# Patient Record
Sex: MALE | Race: WHITE | Employment: OTHER | URBAN - METROPOLITAN AREA
[De-identification: names, ages, dates, MRNs, and addresses within clinical notes are randomized per-mention and may not be internally consistent; named-entity substitution may affect disease eponyms.]

---

## 2019-08-09 ENCOUNTER — OFFICE VISIT (OUTPATIENT)
Dept: CARDIOLOGY CLINIC | Age: 79
End: 2019-08-09

## 2019-08-09 VITALS
HEIGHT: 70 IN | WEIGHT: 223 LBS | OXYGEN SATURATION: 97 % | HEART RATE: 68 BPM | BODY MASS INDEX: 31.92 KG/M2 | DIASTOLIC BLOOD PRESSURE: 78 MMHG | SYSTOLIC BLOOD PRESSURE: 130 MMHG | RESPIRATION RATE: 17 BRPM

## 2019-08-09 DIAGNOSIS — Z82.49 FAMILY HISTORY OF EARLY CAD: ICD-10-CM

## 2019-08-09 DIAGNOSIS — R00.2 HEART PALPITATIONS: Primary | ICD-10-CM

## 2019-08-09 DIAGNOSIS — I49.1 PAC (PREMATURE ATRIAL CONTRACTION): ICD-10-CM

## 2019-08-09 RX ORDER — OMEPRAZOLE 20 MG/1
20 TABLET, DELAYED RELEASE ORAL
COMMUNITY

## 2019-08-09 NOTE — PROGRESS NOTES
[place]: [place]  [phone]    History of Present Illness:  Mr. Jesus Martinez is a 67 yo M with family history of early coronary artery disease referred by Dr. Baltazar Jesus for cardiac evaluation. He is here due to irregular heartbeat. He was going for a checkup as it had been a while since his last colonoscopy and they noted there on exam to have an irregular heart rhythm. He denies any prior cardiac history. He does have very rare occasional palpitation. No exertional chest pain or shortness of breath. He is compensated on exam with clear lungs and no lower extremity edema. His EKG was normal sinus rhythm. There was an occasional premature atrial contraction. Soc hx. No tobacco. Self business, laundry equipment. Fam hx. Brother MI 48 yo. Assessment and Plan:    1. Palpitation, PAC. These are benign and he is asymptomatic with these. Reassured the patient. No additional cardiac evaluation is indicated at this time. We did talk about the symptoms that we would sometimes be concerned like lightheadedness, dizziness or shortness of breath where he might need a heart monitor. He can follow here on an as needed basis. 2. Family history of early coronary artery disease. He  has a past medical history of Left groin pain (5/12/2014). All other systems negative except as above. PE  Vitals:    08/09/19 0913   BP: 130/78   Pulse: 68   Resp: 17   SpO2: 97%   Weight: 223 lb (101.2 kg)   Height: 5' 10\" (1.778 m)    Body mass index is 32 kg/m².    General appearance - alert, well appearing, and in no distress  Mental status - affect appropriate to mood  Eyes - sclera anicteric, moist mucous membranes  Neck - supple, no JVD  Chest - clear to auscultation, no wheezes, rales or rhonchi  Heart - normal rate, regular rhythm, normal S1, S2, no murmurs, rubs, clicks or gallops  Abdomen - soft, nontender, nondistended, no masses or organomegaly  Neurological - no focal deficit  Extremities - peripheral pulses normal, no pedal edema      Recent Labs:  No results found for: CHOL, CHOLX, CHLST, CHOLV, 418683, HDL, LDL, LDLC, DLDLP, TGLX, TRIGL, TRIGP, CHHD, CHHDX  No results found for: JERROD, CREAPOC, [address], CREA, REFC3, REFC4  No results found for: BUN, BUNPOC, IBUN, MBUNV, BUNV  No results found for: K, KI, PLK, WBK  No results found for: HBA1C, HGBE8, GGR5OCGK, FZQ8RBTL  No results found for: HGBPOC, HGB, HGBP, HGBEXT, HGBEXT  No results found for: PLT, PLTEXT, PLTEXT    Reviewed:  Past Medical History:   Diagnosis Date    Left groin pain 5/12/2014     Social History     Tobacco Use   Smoking Status Never Smoker   Smokeless Tobacco Never Used     Social History     Substance and Sexual Activity   Alcohol Use No     Allergies   Allergen Reactions    Sulfa (Sulfonamide Antibiotics) Hives       Current Outpatient Medications   Medication Sig    omeprazole (PRILOSEC OTC) 20 mg tablet Take 20 mg by mouth daily. No current facility-administered medications for this visit.         Shanna Peabody, MD  [facility] and [facility] [place]  [address] [facility] [address]  [address]

## 2019-08-09 NOTE — LETTER
8/10/2019 1:30 AM 
 
Patient:  Charity Blank YOB: 1940 Date of Visit: 8/9/2019 Dear MD Kitty Ruiz [address] [place] [address] A [facility] 7 [id number] VIA Facsimile: [phone] 
 : 
 
 
Thank you for referring Mr. Charity Blank to me for evaluation/treatment. Below are the relevant portions of my assessment and plan of care. Mr. Dola Goodpasture is a 65 yo M with family history of early coronary artery disease referred by Dr. Denise Lilly for cardiac evaluation. He is here due to irregular heartbeat. He was going for a checkup as it had been a while since his last colonoscopy and they noted there on exam to have an irregular heart rhythm. He denies any prior cardiac history. He does have very rare occasional palpitation. No exertional chest pain or shortness of breath. He is compensated on exam with clear lungs and no lower extremity edema. His EKG was normal sinus rhythm. There was an occasional premature atrial contraction. Soc hx. No tobacco. Self business, laundry equipment. Fam hx. Brother MI 48 yo. Assessment and Plan: 1. Palpitation, PAC. These are benign and he is asymptomatic with these. Reassured the patient. No additional cardiac evaluation is indicated at this time. We did talk about the symptoms that we would sometimes be concerned like lightheadedness, dizziness or shortness of breath where he might need a heart monitor. He can follow here on an as needed basis. 2. Family history of early coronary artery disease. If you have questions, please do not hesitate to call me. Sincerely, Kieran Gudino MD

## 2019-08-09 NOTE — LETTER
8/10/2019 1:30 AM 
 
Patient:  An Figueroa YOB: 1940 Date of Visit: 8/9/2019 Dear Bossman Callejas MD 
[address] [place] [address] VIA Facsimile: [phone] MD Aldo [address] [place] [address] VIA Facsimile: [phone] 
 : 
 
 
Thank you for referring Mr. An Figueroa to me for evaluation/treatment. Below are the relevant portions of my assessment and plan of care. Mr. Sydnie Marquis is a 65 yo M with family history of early coronary artery disease referred by Dr. Mami Montes for cardiac evaluation. He is here due to irregular heartbeat. He was going for a checkup as it had been a while since his last colonoscopy and they noted there on exam to have an irregular heart rhythm. He denies any prior cardiac history. He does have very rare occasional palpitation. No exertional chest pain or shortness of breath. He is compensated on exam with clear lungs and no lower extremity edema. His EKG was normal sinus rhythm. There was an occasional premature atrial contraction. Soc hx. No tobacco. Self business, laundry equipment. Fam hx. Brother MI 48 yo. Assessment and Plan: 1. Palpitation, PAC. These are benign and he is asymptomatic with these. Reassured the patient. No additional cardiac evaluation is indicated at this time. We did talk about the symptoms that we would sometimes be concerned like lightheadedness, dizziness or shortness of breath where he might need a heart monitor. He can follow here on an as needed basis. 2. Family history of early coronary artery disease. If you have questions, please do not hesitate to call me. Sincerely, Jennifer Benavides MD

## 2020-07-10 ENCOUNTER — HOSPITAL ENCOUNTER (OUTPATIENT)
Dept: PREADMISSION TESTING | Age: 80
Discharge: HOME | End: 2020-07-10
Payer: MEDICARE

## 2020-07-10 VITALS
BODY MASS INDEX: 31.81 KG/M2 | TEMPERATURE: 98 F | HEIGHT: 70 IN | WEIGHT: 222.22 LBS | DIASTOLIC BLOOD PRESSURE: 74 MMHG | HEART RATE: 68 BPM | SYSTOLIC BLOOD PRESSURE: 150 MMHG | RESPIRATION RATE: 20 BRPM

## 2020-07-10 LAB
ANION GAP SERPL CALC-SCNC: 7 MMOL/L (ref 5–15)
BASOPHILS # BLD: 0 K/UL (ref 0–0.1)
BASOPHILS NFR BLD: 1 % (ref 0–1)
BUN SERPL-MCNC: 22 MG/DL (ref 6–20)
BUN/CREAT SERPL: 22 (ref 12–20)
CALCIUM SERPL-MCNC: 8.3 MG/DL (ref 8.5–10.1)
CHLORIDE SERPL-SCNC: 109 MMOL/L (ref 97–108)
CO2 SERPL-SCNC: 22 MMOL/L (ref 21–32)
CREAT SERPL-MCNC: 1.02 MG/DL (ref 0.7–1.3)
DIFFERENTIAL METHOD BLD: NORMAL
EOSINOPHIL # BLD: 0.3 K/UL (ref 0–0.4)
EOSINOPHIL NFR BLD: 5 % (ref 0–7)
ERYTHROCYTE [DISTWIDTH] IN BLOOD BY AUTOMATED COUNT: 13.2 % (ref 11.5–14.5)
GLUCOSE SERPL-MCNC: 118 MG/DL (ref 65–100)
HCT VFR BLD AUTO: 41.3 % (ref 36.6–50.3)
HGB BLD-MCNC: 13.5 G/DL (ref 12.1–17)
IMM GRANULOCYTES # BLD AUTO: 0 K/UL (ref 0–0.04)
IMM GRANULOCYTES NFR BLD AUTO: 0 % (ref 0–0.5)
LYMPHOCYTES # BLD: 1.1 K/UL (ref 0.8–3.5)
LYMPHOCYTES NFR BLD: 20 % (ref 12–49)
MCH RBC QN AUTO: 31.6 PG (ref 26–34)
MCHC RBC AUTO-ENTMCNC: 32.7 G/DL (ref 30–36.5)
MCV RBC AUTO: 96.7 FL (ref 80–99)
MONOCYTES # BLD: 0.5 K/UL (ref 0–1)
MONOCYTES NFR BLD: 10 % (ref 5–13)
NEUTS SEG # BLD: 3.5 K/UL (ref 1.8–8)
NEUTS SEG NFR BLD: 64 % (ref 32–75)
NRBC # BLD: 0 K/UL (ref 0–0.01)
NRBC BLD-RTO: 0 PER 100 WBC
PLATELET # BLD AUTO: 180 K/UL (ref 150–400)
PMV BLD AUTO: 10.6 FL (ref 8.9–12.9)
POTASSIUM SERPL-SCNC: 4.2 MMOL/L (ref 3.5–5.1)
RBC # BLD AUTO: 4.27 M/UL (ref 4.1–5.7)
SODIUM SERPL-SCNC: 138 MMOL/L (ref 136–145)
WBC # BLD AUTO: 5.4 K/UL (ref 4.1–11.1)

## 2020-07-10 PROCEDURE — 36415 COLL VENOUS BLD VENIPUNCTURE: CPT

## 2020-07-10 PROCEDURE — 80048 BASIC METABOLIC PNL TOTAL CA: CPT

## 2020-07-10 PROCEDURE — 93005 ELECTROCARDIOGRAM TRACING: CPT

## 2020-07-10 PROCEDURE — 85025 COMPLETE CBC W/AUTO DIFF WBC: CPT

## 2020-07-10 NOTE — PERIOP NOTES
PATIENT ADVISED NOT TO EAT/DRINK ANYTHING PAST MIDNIGHT EVENING PRIOR TO SURGERY,  NOTHING TO EAT/DRINK MORNING OF SURGERY UNLESS SIP OF WATER TO SWALLOW MEDICATION;  LEAVE ALL VALUABLES AT HOME; DO BRING PICTURE ID, INSURANCE CARD AND ANY COPAY; WEAR COMFORTABLE CLOTHING;  NO PERFUMES, POWDERS, LOTIONS; NO ALCOHOL 24 HOURS BEFORE OR AFTER SURGERY;  WILL NEED TO BE DRIVEN HOME BY FAMILY OR FRIEND;  AVOID TAKING NSAIDS, ASPIRIN, FISH OIL, VITAMIN E OR GLUCOSAMINE/CHONDROITIN DURING THIS TIME PRIOR TO SURGERY;  MAY TAKE TYLENOL. INSTRUCTIONS PROVIDED ON THE USE OF CHLORHEXIDINE SOLUTION THE EVENING BEFORE AND THE MORNING OF SURGERY. INSTRUCTED  TO FOLLOW NEW PROCEDURE FOR ARRIVAL FOR THE DAY OF SURGERY. WRITTEN INFORMATION PROVIDED. INFORMATION PROVIDED ON COVID 19 TESTING AND SELF QUARANTINE 4 DAYS PRIOR TO SURGERY. WRITTEN AND VERBAL INFORMATION GIVEN. PATIENT VOICED UNDERSTANDING OF SAME.

## 2020-07-11 LAB
ATRIAL RATE: 62 BPM
CALCULATED P AXIS, ECG09: 14 DEGREES
CALCULATED R AXIS, ECG10: -25 DEGREES
CALCULATED T AXIS, ECG11: 38 DEGREES
DIAGNOSIS, 93000: NORMAL
P-R INTERVAL, ECG05: 222 MS
Q-T INTERVAL, ECG07: 396 MS
QRS DURATION, ECG06: 100 MS
QTC CALCULATION (BEZET), ECG08: 401 MS
VENTRICULAR RATE, ECG03: 62 BPM

## 2020-07-16 ENCOUNTER — HOSPITAL ENCOUNTER (OUTPATIENT)
Dept: PREADMISSION TESTING | Age: 80
Discharge: HOME | End: 2020-07-16
Payer: MEDICARE

## 2020-07-16 DIAGNOSIS — U07.1 COVID-19: ICD-10-CM

## 2020-07-16 PROCEDURE — 87635 SARS-COV-2 COVID-19 AMP PRB: CPT

## 2020-07-17 ENCOUNTER — ANESTHESIA EVENT (OUTPATIENT)
Dept: SURGERY | Age: 80
End: 2020-07-17
Payer: MEDICARE

## 2020-07-18 LAB — SARS-COV-2, COV2NT: NOT DETECTED

## 2020-07-20 ENCOUNTER — ANESTHESIA (OUTPATIENT)
Dept: SURGERY | Age: 80
End: 2020-07-20
Payer: MEDICARE

## 2020-07-20 ENCOUNTER — HOSPITAL ENCOUNTER (OUTPATIENT)
Age: 80
Discharge: HOME | End: 2020-07-21
Attending: SURGERY | Admitting: SURGERY
Payer: MEDICARE

## 2020-07-20 DIAGNOSIS — C44.92 SCCA (SQUAMOUS CELL CARCINOMA) OF SKIN: Primary | ICD-10-CM

## 2020-07-20 PROCEDURE — 77030026438 HC STYL ET INTUB CARD -A: Performed by: ANESTHESIOLOGY

## 2020-07-20 PROCEDURE — 88305 TISSUE EXAM BY PATHOLOGIST: CPT

## 2020-07-20 PROCEDURE — 77030019908 HC STETH ESOPH SIMS -A: Performed by: ANESTHESIOLOGY

## 2020-07-20 PROCEDURE — 74011000250 HC RX REV CODE- 250: Performed by: SURGERY

## 2020-07-20 PROCEDURE — 74011250636 HC RX REV CODE- 250/636: Performed by: SURGERY

## 2020-07-20 PROCEDURE — 99218 HC RM OBSERVATION: CPT

## 2020-07-20 PROCEDURE — 74011000258 HC RX REV CODE- 258: Performed by: SURGERY

## 2020-07-20 PROCEDURE — 74011250636 HC RX REV CODE- 250/636: Performed by: ANESTHESIOLOGY

## 2020-07-20 PROCEDURE — 77030040361 HC SLV COMPR DVT MDII -B: Performed by: SURGERY

## 2020-07-20 PROCEDURE — 76060000038 HC ANESTHESIA 3.5 TO 4 HR: Performed by: SURGERY

## 2020-07-20 PROCEDURE — 77030040922 HC BLNKT HYPOTHRM STRY -A

## 2020-07-20 PROCEDURE — 77030013079 HC BLNKT BAIR HGGR 3M -A: Performed by: ANESTHESIOLOGY

## 2020-07-20 PROCEDURE — 88331 PATH CONSLTJ SURG 1 BLK 1SPC: CPT

## 2020-07-20 PROCEDURE — 77030011640 HC PAD GRND REM COVD -A: Performed by: SURGERY

## 2020-07-20 PROCEDURE — 94760 N-INVAS EAR/PLS OXIMETRY 1: CPT

## 2020-07-20 PROCEDURE — 74011000250 HC RX REV CODE- 250: Performed by: NURSE ANESTHETIST, CERTIFIED REGISTERED

## 2020-07-20 PROCEDURE — 77030002996 HC SUT SLK J&J -A: Performed by: SURGERY

## 2020-07-20 PROCEDURE — 77030002888 HC SUT CHRMC J&J -A: Performed by: SURGERY

## 2020-07-20 PROCEDURE — 77030008684 HC TU ET CUF COVD -B: Performed by: ANESTHESIOLOGY

## 2020-07-20 PROCEDURE — 74011000258 HC RX REV CODE- 258: Performed by: NURSE ANESTHETIST, CERTIFIED REGISTERED

## 2020-07-20 PROCEDURE — 74011250637 HC RX REV CODE- 250/637: Performed by: SURGERY

## 2020-07-20 PROCEDURE — 74011250636 HC RX REV CODE- 250/636: Performed by: NURSE ANESTHETIST, CERTIFIED REGISTERED

## 2020-07-20 PROCEDURE — 74011250637 HC RX REV CODE- 250/637: Performed by: ANESTHESIOLOGY

## 2020-07-20 PROCEDURE — 77030031139 HC SUT VCRL2 J&J -A: Performed by: SURGERY

## 2020-07-20 PROCEDURE — 77030002997 HC SUT SLK J&J -B: Performed by: SURGERY

## 2020-07-20 PROCEDURE — 76210000006 HC OR PH I REC 0.5 TO 1 HR: Performed by: SURGERY

## 2020-07-20 PROCEDURE — 76010000133 HC OR TIME 3 TO 3.5 HR: Performed by: SURGERY

## 2020-07-20 PROCEDURE — 77030002916 HC SUT ETHLN J&J -A: Performed by: SURGERY

## 2020-07-20 PROCEDURE — 77030018836 HC SOL IRR NACL ICUM -A: Performed by: SURGERY

## 2020-07-20 RX ORDER — ONDANSETRON 2 MG/ML
INJECTION INTRAMUSCULAR; INTRAVENOUS AS NEEDED
Status: DISCONTINUED | OUTPATIENT
Start: 2020-07-20 | End: 2020-07-20 | Stop reason: HOSPADM

## 2020-07-20 RX ORDER — MORPHINE SULFATE 2 MG/ML
2 INJECTION, SOLUTION INTRAMUSCULAR; INTRAVENOUS
Status: DISCONTINUED | OUTPATIENT
Start: 2020-07-20 | End: 2020-07-20 | Stop reason: HOSPADM

## 2020-07-20 RX ORDER — PANTOPRAZOLE SODIUM 40 MG/1
40 TABLET, DELAYED RELEASE ORAL
Status: DISCONTINUED | OUTPATIENT
Start: 2020-07-20 | End: 2020-07-21 | Stop reason: HOSPADM

## 2020-07-20 RX ORDER — OXYCODONE AND ACETAMINOPHEN 5; 325 MG/1; MG/1
1 TABLET ORAL AS NEEDED
Status: DISCONTINUED | OUTPATIENT
Start: 2020-07-20 | End: 2020-07-20 | Stop reason: HOSPADM

## 2020-07-20 RX ORDER — DEXAMETHASONE SODIUM PHOSPHATE 4 MG/ML
INJECTION, SOLUTION INTRA-ARTICULAR; INTRALESIONAL; INTRAMUSCULAR; INTRAVENOUS; SOFT TISSUE AS NEEDED
Status: DISCONTINUED | OUTPATIENT
Start: 2020-07-20 | End: 2020-07-20 | Stop reason: HOSPADM

## 2020-07-20 RX ORDER — FENTANYL CITRATE 50 UG/ML
50 INJECTION, SOLUTION INTRAMUSCULAR; INTRAVENOUS AS NEEDED
Status: DISCONTINUED | OUTPATIENT
Start: 2020-07-20 | End: 2020-07-20 | Stop reason: HOSPADM

## 2020-07-20 RX ORDER — PANTOPRAZOLE SODIUM 40 MG/1
40 TABLET, DELAYED RELEASE ORAL DAILY
Status: DISCONTINUED | OUTPATIENT
Start: 2020-07-21 | End: 2020-07-20

## 2020-07-20 RX ORDER — MIDAZOLAM HYDROCHLORIDE 1 MG/ML
INJECTION, SOLUTION INTRAMUSCULAR; INTRAVENOUS AS NEEDED
Status: DISCONTINUED | OUTPATIENT
Start: 2020-07-20 | End: 2020-07-20 | Stop reason: HOSPADM

## 2020-07-20 RX ORDER — SODIUM CHLORIDE 0.9 % (FLUSH) 0.9 %
5-40 SYRINGE (ML) INJECTION EVERY 8 HOURS
Status: DISCONTINUED | OUTPATIENT
Start: 2020-07-20 | End: 2020-07-21 | Stop reason: HOSPADM

## 2020-07-20 RX ORDER — SODIUM CHLORIDE 0.9 % (FLUSH) 0.9 %
5-40 SYRINGE (ML) INJECTION AS NEEDED
Status: DISCONTINUED | OUTPATIENT
Start: 2020-07-20 | End: 2020-07-21 | Stop reason: HOSPADM

## 2020-07-20 RX ORDER — MIDAZOLAM HYDROCHLORIDE 1 MG/ML
1 INJECTION, SOLUTION INTRAMUSCULAR; INTRAVENOUS AS NEEDED
Status: DISCONTINUED | OUTPATIENT
Start: 2020-07-20 | End: 2020-07-20 | Stop reason: HOSPADM

## 2020-07-20 RX ORDER — ONDANSETRON 2 MG/ML
4 INJECTION INTRAMUSCULAR; INTRAVENOUS
Status: DISCONTINUED | OUTPATIENT
Start: 2020-07-20 | End: 2020-07-21 | Stop reason: HOSPADM

## 2020-07-20 RX ORDER — SODIUM CHLORIDE 9 MG/ML
25 INJECTION, SOLUTION INTRAVENOUS CONTINUOUS
Status: DISCONTINUED | OUTPATIENT
Start: 2020-07-20 | End: 2020-07-20 | Stop reason: HOSPADM

## 2020-07-20 RX ORDER — FENTANYL CITRATE 50 UG/ML
INJECTION, SOLUTION INTRAMUSCULAR; INTRAVENOUS AS NEEDED
Status: DISCONTINUED | OUTPATIENT
Start: 2020-07-20 | End: 2020-07-20 | Stop reason: HOSPADM

## 2020-07-20 RX ORDER — LIDOCAINE HYDROCHLORIDE 20 MG/ML
INJECTION, SOLUTION EPIDURAL; INFILTRATION; INTRACAUDAL; PERINEURAL AS NEEDED
Status: DISCONTINUED | OUTPATIENT
Start: 2020-07-20 | End: 2020-07-20 | Stop reason: HOSPADM

## 2020-07-20 RX ORDER — ACETAMINOPHEN 325 MG/1
650 TABLET ORAL ONCE
Status: COMPLETED | OUTPATIENT
Start: 2020-07-20 | End: 2020-07-20

## 2020-07-20 RX ORDER — SODIUM CHLORIDE 0.9 % (FLUSH) 0.9 %
5-40 SYRINGE (ML) INJECTION AS NEEDED
Status: DISCONTINUED | OUTPATIENT
Start: 2020-07-20 | End: 2020-07-20 | Stop reason: HOSPADM

## 2020-07-20 RX ORDER — ROCURONIUM BROMIDE 10 MG/ML
INJECTION, SOLUTION INTRAVENOUS AS NEEDED
Status: DISCONTINUED | OUTPATIENT
Start: 2020-07-20 | End: 2020-07-20 | Stop reason: HOSPADM

## 2020-07-20 RX ORDER — HYDROMORPHONE HYDROCHLORIDE 1 MG/ML
0.2 INJECTION, SOLUTION INTRAMUSCULAR; INTRAVENOUS; SUBCUTANEOUS
Status: COMPLETED | OUTPATIENT
Start: 2020-07-20 | End: 2020-07-20

## 2020-07-20 RX ORDER — ONDANSETRON 2 MG/ML
INJECTION INTRAMUSCULAR; INTRAVENOUS
Status: DISCONTINUED
Start: 2020-07-20 | End: 2020-07-20

## 2020-07-20 RX ORDER — MORPHINE SULFATE 2 MG/ML
2-4 INJECTION, SOLUTION INTRAMUSCULAR; INTRAVENOUS
Status: DISCONTINUED | OUTPATIENT
Start: 2020-07-20 | End: 2020-07-21 | Stop reason: HOSPADM

## 2020-07-20 RX ORDER — PHENYLEPHRINE HCL IN 0.9% NACL 0.4MG/10ML
SYRINGE (ML) INTRAVENOUS AS NEEDED
Status: DISCONTINUED | OUTPATIENT
Start: 2020-07-20 | End: 2020-07-20 | Stop reason: HOSPADM

## 2020-07-20 RX ORDER — CEFAZOLIN SODIUM/WATER 2 G/20 ML
2 SYRINGE (ML) INTRAVENOUS
Status: COMPLETED | OUTPATIENT
Start: 2020-07-20 | End: 2020-07-20

## 2020-07-20 RX ORDER — ONDANSETRON 2 MG/ML
4 INJECTION INTRAMUSCULAR; INTRAVENOUS AS NEEDED
Status: DISCONTINUED | OUTPATIENT
Start: 2020-07-20 | End: 2020-07-20 | Stop reason: HOSPADM

## 2020-07-20 RX ORDER — SODIUM CHLORIDE 0.9 % (FLUSH) 0.9 %
5-40 SYRINGE (ML) INJECTION EVERY 8 HOURS
Status: DISCONTINUED | OUTPATIENT
Start: 2020-07-20 | End: 2020-07-20 | Stop reason: HOSPADM

## 2020-07-20 RX ORDER — OXYCODONE HCL 5 MG/5 ML
5-10 SOLUTION, ORAL ORAL
Status: DISCONTINUED | OUTPATIENT
Start: 2020-07-20 | End: 2020-07-21 | Stop reason: HOSPADM

## 2020-07-20 RX ORDER — PROPOFOL 10 MG/ML
INJECTION, EMULSION INTRAVENOUS AS NEEDED
Status: DISCONTINUED | OUTPATIENT
Start: 2020-07-20 | End: 2020-07-20 | Stop reason: HOSPADM

## 2020-07-20 RX ORDER — SODIUM CHLORIDE, SODIUM LACTATE, POTASSIUM CHLORIDE, CALCIUM CHLORIDE 600; 310; 30; 20 MG/100ML; MG/100ML; MG/100ML; MG/100ML
125 INJECTION, SOLUTION INTRAVENOUS CONTINUOUS
Status: DISCONTINUED | OUTPATIENT
Start: 2020-07-20 | End: 2020-07-20 | Stop reason: HOSPADM

## 2020-07-20 RX ORDER — ACETAMINOPHEN 325 MG/1
650 TABLET ORAL
Status: DISCONTINUED | OUTPATIENT
Start: 2020-07-20 | End: 2020-07-21 | Stop reason: HOSPADM

## 2020-07-20 RX ORDER — MIDAZOLAM HYDROCHLORIDE 1 MG/ML
0.5 INJECTION, SOLUTION INTRAMUSCULAR; INTRAVENOUS
Status: DISCONTINUED | OUTPATIENT
Start: 2020-07-20 | End: 2020-07-20 | Stop reason: HOSPADM

## 2020-07-20 RX ORDER — NALOXONE HYDROCHLORIDE 0.4 MG/ML
0.4 INJECTION, SOLUTION INTRAMUSCULAR; INTRAVENOUS; SUBCUTANEOUS AS NEEDED
Status: DISCONTINUED | OUTPATIENT
Start: 2020-07-20 | End: 2020-07-21 | Stop reason: HOSPADM

## 2020-07-20 RX ORDER — FAMOTIDINE 20 MG/1
20 TABLET, FILM COATED ORAL
Status: DISCONTINUED | OUTPATIENT
Start: 2020-07-20 | End: 2020-07-21 | Stop reason: HOSPADM

## 2020-07-20 RX ORDER — DEXTROSE MONOHYDRATE, SODIUM CHLORIDE, SODIUM LACTATE, POTASSIUM CHLORIDE, CALCIUM CHLORIDE 5; 600; 310; 179; 20 G/100ML; MG/100ML; MG/100ML; MG/100ML; MG/100ML
INJECTION, SOLUTION INTRAVENOUS CONTINUOUS
Status: DISPENSED | OUTPATIENT
Start: 2020-07-20 | End: 2020-07-21

## 2020-07-20 RX ORDER — LIDOCAINE HYDROCHLORIDE 10 MG/ML
0.1 INJECTION, SOLUTION EPIDURAL; INFILTRATION; INTRACAUDAL; PERINEURAL AS NEEDED
Status: DISCONTINUED | OUTPATIENT
Start: 2020-07-20 | End: 2020-07-20 | Stop reason: HOSPADM

## 2020-07-20 RX ORDER — MUPIROCIN 20 MG/G
OINTMENT TOPICAL DAILY
Status: DISCONTINUED | OUTPATIENT
Start: 2020-07-21 | End: 2020-07-21 | Stop reason: HOSPADM

## 2020-07-20 RX ORDER — DIPHENHYDRAMINE HYDROCHLORIDE 50 MG/ML
12.5 INJECTION, SOLUTION INTRAMUSCULAR; INTRAVENOUS AS NEEDED
Status: DISCONTINUED | OUTPATIENT
Start: 2020-07-20 | End: 2020-07-20 | Stop reason: HOSPADM

## 2020-07-20 RX ORDER — SUCCINYLCHOLINE CHLORIDE 20 MG/ML
INJECTION INTRAMUSCULAR; INTRAVENOUS AS NEEDED
Status: DISCONTINUED | OUTPATIENT
Start: 2020-07-20 | End: 2020-07-20 | Stop reason: HOSPADM

## 2020-07-20 RX ORDER — FENTANYL CITRATE 50 UG/ML
25 INJECTION, SOLUTION INTRAMUSCULAR; INTRAVENOUS
Status: COMPLETED | OUTPATIENT
Start: 2020-07-20 | End: 2020-07-20

## 2020-07-20 RX ADMIN — FENTANYL CITRATE 50 MCG: 50 INJECTION, SOLUTION INTRAMUSCULAR; INTRAVENOUS at 09:29

## 2020-07-20 RX ADMIN — HYDROMORPHONE HYDROCHLORIDE 0.2 MG: 1 INJECTION, SOLUTION INTRAMUSCULAR; INTRAVENOUS; SUBCUTANEOUS at 18:45

## 2020-07-20 RX ADMIN — Medication 100 MCG: at 10:08

## 2020-07-20 RX ADMIN — PHENYLEPHRINE HYDROCHLORIDE 40 MCG/MIN: 10 INJECTION INTRAVENOUS at 10:32

## 2020-07-20 RX ADMIN — HYDROMORPHONE HYDROCHLORIDE 0.2 MG: 1 INJECTION, SOLUTION INTRAMUSCULAR; INTRAVENOUS; SUBCUTANEOUS at 18:15

## 2020-07-20 RX ADMIN — MEPERIDINE HYDROCHLORIDE 12.5 MG: 25 INJECTION INTRAMUSCULAR; INTRAVENOUS; SUBCUTANEOUS at 16:30

## 2020-07-20 RX ADMIN — Medication 10 ML: at 21:34

## 2020-07-20 RX ADMIN — SODIUM CHLORIDE, SODIUM LACTATE, POTASSIUM CHLORIDE, AND CALCIUM CHLORIDE 125 ML/HR: 600; 310; 30; 20 INJECTION, SOLUTION INTRAVENOUS at 08:15

## 2020-07-20 RX ADMIN — Medication 2 G: at 09:45

## 2020-07-20 RX ADMIN — MORPHINE SULFATE 4 MG: 2 INJECTION, SOLUTION INTRAMUSCULAR; INTRAVENOUS at 21:34

## 2020-07-20 RX ADMIN — ONDANSETRON HYDROCHLORIDE 4 MG: 2 INJECTION, SOLUTION INTRAMUSCULAR; INTRAVENOUS at 12:43

## 2020-07-20 RX ADMIN — MIDAZOLAM 2 MG: 1 INJECTION INTRAMUSCULAR; INTRAVENOUS at 09:12

## 2020-07-20 RX ADMIN — FENTANYL CITRATE 25 MCG: 50 INJECTION, SOLUTION INTRAMUSCULAR; INTRAVENOUS at 16:15

## 2020-07-20 RX ADMIN — Medication 100 MCG: at 10:05

## 2020-07-20 RX ADMIN — Medication 100 MCG: at 10:24

## 2020-07-20 RX ADMIN — ONDANSETRON HYDROCHLORIDE 4 MG: 2 INJECTION, SOLUTION INTRAMUSCULAR; INTRAVENOUS at 10:12

## 2020-07-20 RX ADMIN — SODIUM CHLORIDE, SODIUM LACTATE, POTASSIUM CHLORIDE, AND CALCIUM CHLORIDE: 600; 310; 30; 20 INJECTION, SOLUTION INTRAVENOUS at 11:17

## 2020-07-20 RX ADMIN — MEPERIDINE HYDROCHLORIDE 12.5 MG: 25 INJECTION INTRAMUSCULAR; INTRAVENOUS; SUBCUTANEOUS at 16:00

## 2020-07-20 RX ADMIN — FENTANYL CITRATE 25 MCG: 50 INJECTION, SOLUTION INTRAMUSCULAR; INTRAVENOUS at 16:10

## 2020-07-20 RX ADMIN — HYDROMORPHONE HYDROCHLORIDE 0.2 MG: 1 INJECTION, SOLUTION INTRAMUSCULAR; INTRAVENOUS; SUBCUTANEOUS at 18:30

## 2020-07-20 RX ADMIN — PROPOFOL 30 MG: 10 INJECTION, EMULSION INTRAVENOUS at 09:37

## 2020-07-20 RX ADMIN — PANTOPRAZOLE SODIUM 40 MG: 40 TABLET, DELAYED RELEASE ORAL at 21:34

## 2020-07-20 RX ADMIN — HYDROMORPHONE HYDROCHLORIDE 0.2 MG: 1 INJECTION, SOLUTION INTRAMUSCULAR; INTRAVENOUS; SUBCUTANEOUS at 17:40

## 2020-07-20 RX ADMIN — FENTANYL CITRATE 25 MCG: 50 INJECTION, SOLUTION INTRAMUSCULAR; INTRAVENOUS at 16:05

## 2020-07-20 RX ADMIN — SUCCINYLCHOLINE CHLORIDE 140 MG: 20 INJECTION, SOLUTION INTRAMUSCULAR; INTRAVENOUS at 09:23

## 2020-07-20 RX ADMIN — CEFAZOLIN 1 G: 1 INJECTION, POWDER, FOR SOLUTION INTRAMUSCULAR; INTRAVENOUS at 16:51

## 2020-07-20 RX ADMIN — Medication 10 ML: at 14:00

## 2020-07-20 RX ADMIN — LIDOCAINE HYDROCHLORIDE 100 MG: 20 INJECTION, SOLUTION EPIDURAL; INFILTRATION; INTRACAUDAL; PERINEURAL at 09:22

## 2020-07-20 RX ADMIN — Medication 100 MCG: at 09:56

## 2020-07-20 RX ADMIN — DEXTROSE MONOHYDRATE, SODIUM CHLORIDE, SODIUM LACTATE, POTASSIUM CHLORIDE, CALCIUM CHLORIDE: 5; 600; 310; 179; 20 INJECTION, SOLUTION INTRAVENOUS at 15:30

## 2020-07-20 RX ADMIN — FENTANYL CITRATE 50 MCG: 50 INJECTION, SOLUTION INTRAMUSCULAR; INTRAVENOUS at 09:22

## 2020-07-20 RX ADMIN — PROPOFOL 100 MG: 10 INJECTION, EMULSION INTRAVENOUS at 09:22

## 2020-07-20 RX ADMIN — ROCURONIUM BROMIDE 5 MG: 10 SOLUTION INTRAVENOUS at 09:22

## 2020-07-20 RX ADMIN — Medication 10 ML: at 16:31

## 2020-07-20 RX ADMIN — ACETAMINOPHEN 650 MG: 325 TABLET ORAL at 08:21

## 2020-07-20 RX ADMIN — DEXAMETHASONE SODIUM PHOSPHATE 4 MG: 4 INJECTION, SOLUTION INTRAMUSCULAR; INTRAVENOUS at 10:12

## 2020-07-20 RX ADMIN — HYDROMORPHONE HYDROCHLORIDE 0.2 MG: 1 INJECTION, SOLUTION INTRAMUSCULAR; INTRAVENOUS; SUBCUTANEOUS at 17:55

## 2020-07-20 RX ADMIN — FENTANYL CITRATE 25 MCG: 50 INJECTION, SOLUTION INTRAMUSCULAR; INTRAVENOUS at 16:20

## 2020-07-20 RX ADMIN — PROPOFOL 100 MG: 10 INJECTION, EMULSION INTRAVENOUS at 09:23

## 2020-07-20 NOTE — H&P
Pre-op History and Physical    CC: SCCA LOWER LIP   HPI: 78y.o. 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   Past medical history:   Past Medical History:   Diagnosis Date    Cancer ([address]     SCCA LOWER LIP    GERD (gastroesophageal reflux disease)     Left groin pain 5/12/2014      Past surgical history:   Past Surgical History:   Procedure Laterality Date    HX HERNIA REPAIR Right       Family history:   Family History   Problem Relation Age of Onset    Heart Disease Mother         MI X4    Diabetes Father     Stroke Father     Cancer Brother         PROSTATE CA    Elevated Lipids Brother     Elevated Lipids Brother     Cancer Brother         LEUKEMIA    Cancer Sister         BREAST CA    Cancer Sister         BREAST CA    Cancer Sister         BREAST CA    Elevated Lipids Brother     Cancer Brother         RENAL CELL    Heart Disease Brother         MI      Social history:   Social History     Socioeconomic History    Marital status:      Spouse name: Not on file    Number of children: Not on file    Years of education: Not on file    Highest education level: Not on file   Occupational History    Not on file   Social Needs    Financial resource strain: Not on file    Food insecurity     Worry: Not on file     Inability: Not on file    Transportation needs     Medical: Not on file     Non-medical: Not on file   Tobacco Use    Smoking status: Never Smoker    Smokeless tobacco: Never Used   Substance and Sexual Activity    Alcohol use: No    Drug use: Never    Sexual activity: Not on file   Lifestyle    Physical activity     Days per week: Not on file     Minutes per session: Not on file    Stress: Not on file   Relationships    Social connections     Talks on phone: Not on file     Gets together: Not on file     Attends Holiness service: Not on file     Active member of club or organization: Not on file     Attends meetings of clubs or organizations: Not on file     Relationship status: Not on file    Intimate partner violence     Fear of current or ex partner: Not on file     Emotionally abused: Not on file     Physically abused: Not on file     Forced sexual activity: Not on file   Other Topics Concern    Not on file   Social History Narrative    Not on file      Home Medications:   Prior to Admission medications    Medication Sig Start Date End Date Taking? Authorizing Provider   omeprazole (PRILOSEC OTC) 20 mg tablet Take 20 mg by mouth daily as needed. Yes Provider, Historical      Allergies: Allergies   Allergen Reactions    Sulfa (Sulfonamide Antibiotics) Hives      Review of systems:  Denies headache, fever, chills, weight change, congestion, sore throat, chest pain, shortness of breath, nausea, vomiting, diarrhea, constipation, abdominal pain, generalized weakness, muscle or joint pain, and rash. Physical Exam:  Vitals: Blood pressure 177/62, pulse 60, temperature 98.2 °F (36.8 °C), resp. rate 16, height 5' 10\" (1.778 m), weight 100.8 kg (222 lb 3.6 oz), SpO2 96 %.    General: awake and alert, NAD  Neck: supple  Cor: RRR  Lungs: clear  Abdomen: soft, non-tender, non-distended  Extremities: no edema  Skin: no rash, ulcerated lesion left lower lip  No LAD    Impression: SCCA LOWER LIP    Plan:  Procedure(s):  EXCISION SCCA LOWER LIP WITH ADVANCEMENT OR LOCAL FLAP RECONSTRUCTION

## 2020-07-20 NOTE — ROUTINE PROCESS
Patient: Sekou Guevara MRN: [id number]  SSN: [id number]   YOB: 1940  Age: 78 y.o. Sex: male     Patient is status post Procedure(s) with comments:  EXCISION SCCA LOWER LIP WITH ESTANDER FLAP RECONCTRUCTION SURGERY - LOWER LIP.     Surgeon(s) and Role:     * Dinora aRndolph MD - Primary    Local/Dose/Irrigation:                    Peripheral IV 07/20/20 Right Hand (Active)   Site Assessment Clean, dry, & intact 07/20/20 0814   Dressing Status Clean, dry, & intact 07/20/20 0814   Dressing Type Transparent 07/20/20 0814   Hub Color/Line Status Infusing 07/20/20 0814                           Dressing/Packing:  Wound Face LOWERLIP-Dressing Type: (MASTISOL, STERI STRIPS, XEROFORM, 4X4) (07/20/20 1100)    Splint/Cast:  ]    Other:

## 2020-07-20 NOTE — BRIEF OP NOTE
Brief Postoperative Note    Patient: Roetta Spatz  YOB: 1940  MRN: [id number]    Date of Procedure: 7/20/2020     Pre-Op Diagnosis: SCCA LOWER LIP    Post-Op Diagnosis: Same as preoperative diagnosis. Procedure(s): 1. EXCISION SCCA LOWER LIP, 3.5 CM  2.  ESTANDER FLAP RECONCTRUCTION, PEDICLED FLAP RECONSTRUCTION    Surgeon(s):  Thiago Muñoz MD    Surgical Assistant: None    Anesthesia: General     Estimated Blood Loss (mL): less than 508     Complications: None    Specimens:   ID Type Source Tests Collected by Time Destination   1 : EXCISION LOWER LIP, STITICH IS ANTERIOR-INFERIOR, 6 O'CLOCK Frozen Section Brad Muñoz MD 7/20/2020 7257 Pathology   2 : LOWER LIP TISSUE, LEFT LATERAL MARGIN, STITCH @ 6 O'CLOCK, ANTERIOR-INFERIOR Frozen Section Brad Muñoz MD 7/20/2020 1000 Pathology   3 : LOWER LIP TISSUE, RIGHT LATERAL MARGIN, STITCH @ 6 O'CLOCK , ANTERIOR-INFERIOR Frozen Section Brad Muñoz MD 7/20/2020 1004 Pathology        Implants: * No implants in log *    Drains: * No LDAs found *    Findings: scca lower lip, margins clear on frozen section    Electronically Signed by Fatmata Michelle MD on 7/20/2020 at 1:00 PM

## 2020-07-20 NOTE — ANESTHESIA PREPROCEDURE EVALUATION
Relevant Problems   No relevant active problems       Anesthetic History   No history of anesthetic complications            Review of Systems / Medical History  Patient summary reviewed, nursing notes reviewed and pertinent labs reviewed    Pulmonary  Within defined limits                 Neuro/Psych   Within defined limits           Cardiovascular  Within defined limits                Exercise tolerance: >4 METS     GI/Hepatic/Renal  Within defined limits   GERD: well controlled           Endo/Other  Within defined limits           Other Findings   Comments: Lower lip lesion         Physical Exam    Airway  Mallampati: II  TM Distance: > 6 cm  Neck ROM: normal range of motion   Mouth opening: Normal     Cardiovascular  Regular rate and rhythm,  S1 and S2 normal,  no murmur, click, rub, or gallop             Dental    Dentition: Poor dentition  Comments: #7 fx at ging   Pulmonary  Breath sounds clear to auscultation               Abdominal  GI exam deferred       Other Findings            Anesthetic Plan    ASA: 2  Anesthesia type: general          Induction: Intravenous  Anesthetic plan and risks discussed with: Patient

## 2020-07-20 NOTE — ANESTHESIA POSTPROCEDURE EVALUATION
Post-Anesthesia Evaluation and Assessment    Patient: Sekou Guevara MRN: [id number]  SSN: [id number]    YOB: 1940  Age: 78 y.o. Sex: male      I have evaluated the patient and they are stable and ready for discharge from the PACU. Cardiovascular Function/Vital Signs  Visit Vitals  /55   Pulse 86   Temp 36.2 °C (97.2 °F)   Resp 18   Ht 5' 10\" (1.778 m)   Wt 100.8 kg (222 lb 3.6 oz)   SpO2 98%   BMI 31.89 kg/m²       Patient is status post General anesthesia for Procedure(s) with comments:  EXCISION SCCA LOWER LIP WITH ESTANDER FLAP RECONCTRUCTION SURGERY - LOWER LIP. Nausea/Vomiting: None    Postoperative hydration reviewed and adequate. Pain:  Pain Scale 1: Numeric (0 - 10) (07/20/20 1312)  Pain Intensity 1: 0 (07/20/20 1312)   Managed    Neurological Status:   Neuro (WDL): Within Defined Limits (07/20/20 1312)  Neuro  Neurologic State: Appropriate for age;Eyes open spontaneously; Eyes open to voice (07/20/20 1312)  LUE Motor Response: Purposeful (07/20/20 1312)  LLE Motor Response: Purposeful (07/20/20 1312)  RUE Motor Response: Purposeful (07/20/20 1312)  RLE Motor Response: Purposeful (07/20/20 1312)   At baseline    Mental Status, Level of Consciousness: Alert and  oriented to person, place, and time    Pulmonary Status:   O2 Device: Room air (07/20/20 1312)   Adequate oxygenation and airway patent    Complications related to anesthesia: None    Post-anesthesia assessment completed. No concerns    Signed By: Asha Tiwari MD     July 20, 2020              Procedure(s):  EXCISION SCCA LOWER LIP WITH ESTANDER FLAP RECONCTRUCTION SURGERY. general    <BSHSIANPOST>    INITIAL Post-op Vital signs:   Vitals Value Taken Time   /64 7/20/2020  1:30 PM   Temp 36.2 °C (97.2 °F) 7/20/2020  1:12 PM   Pulse 80 7/20/2020  1:32 PM   Resp 14 7/20/2020  1:32 PM   SpO2 98 % 7/20/2020  1:32 PM   Vitals shown include unvalidated device data.

## 2020-07-21 VITALS
TEMPERATURE: 98.9 F | BODY MASS INDEX: 31.81 KG/M2 | WEIGHT: 222.22 LBS | RESPIRATION RATE: 18 BRPM | HEART RATE: 76 BPM | DIASTOLIC BLOOD PRESSURE: 66 MMHG | OXYGEN SATURATION: 96 % | HEIGHT: 70 IN | SYSTOLIC BLOOD PRESSURE: 135 MMHG

## 2020-07-21 PROCEDURE — 74011250636 HC RX REV CODE- 250/636: Performed by: SURGERY

## 2020-07-21 PROCEDURE — 74011000258 HC RX REV CODE- 258: Performed by: SURGERY

## 2020-07-21 PROCEDURE — 74011250637 HC RX REV CODE- 250/637: Performed by: SURGERY

## 2020-07-21 RX ORDER — AMOXICILLIN AND CLAVULANATE POTASSIUM 875; 125 MG/1; MG/1
1 TABLET, FILM COATED ORAL 2 TIMES DAILY
Qty: 20 TAB | Refills: 0 | Status: SHIPPED | OUTPATIENT
Start: 2020-07-21 | End: 2020-07-31

## 2020-07-21 RX ORDER — ACETAMINOPHEN AND CODEINE PHOSPHATE 300; 30 MG/1; MG/1
1 TABLET ORAL
Qty: 15 TAB | Refills: 0 | Status: SHIPPED | OUTPATIENT
Start: 2020-07-21 | End: 2020-07-24

## 2020-07-21 RX ORDER — MUPIROCIN 20 MG/G
OINTMENT TOPICAL DAILY
Qty: 30 G | Refills: 1 | Status: SHIPPED | OUTPATIENT
Start: 2020-07-21

## 2020-07-21 RX ADMIN — DEXTROSE MONOHYDRATE, SODIUM CHLORIDE, SODIUM LACTATE, POTASSIUM CHLORIDE, CALCIUM CHLORIDE: 5; 600; 310; 179; 20 INJECTION, SOLUTION INTRAVENOUS at 01:22

## 2020-07-21 RX ADMIN — CEFAZOLIN 1 G: 1 INJECTION, POWDER, FOR SOLUTION INTRAMUSCULAR; INTRAVENOUS at 00:38

## 2020-07-21 RX ADMIN — MUPIROCIN: 20 OINTMENT TOPICAL at 09:22

## 2020-07-21 RX ADMIN — CEFAZOLIN 1 G: 1 INJECTION, POWDER, FOR SOLUTION INTRAMUSCULAR; INTRAVENOUS at 09:20

## 2020-07-21 RX ADMIN — Medication 5 ML: at 06:00

## 2020-07-21 RX ADMIN — OXYCODONE HYDROCHLORIDE 10 MG: 5 SOLUTION ORAL at 00:32

## 2020-07-21 RX ADMIN — OXYCODONE HYDROCHLORIDE 5 MG: 5 SOLUTION ORAL at 10:44

## 2020-07-21 NOTE — PROGRESS NOTES
Primary Nurse Torres Grant and HIPOLITO Willis performed a dual skin assessment on this patient No impairment noted  Malcolm score is 21

## 2020-07-21 NOTE — DISCHARGE INSTRUCTIONS
Instructions after Minor Plastic Surgery Procedures    [address] MD Jaskaran Groves MD      You have had a minor surgical procedure. Please follow these simple instructions to help ensure a safe and comfortable recovery. Any questions can be directed to the office at  [phone] or the answering service at [phone] (after hours)    YOU ARE RESTRICTED TO A SOFT DIET, NO HOT LIQUIDS, AVOID ACIDIC OR SPICEY FOOD    1. Expect  some bruising and mild redness to appear in the days following your surgery. This is normal and will resolve as the wound heals, but may persist until the stitches have been removed. 2. Keep head elevated                                                                                                                              3. If a bandage has been placed, it can be removed or changed at 24 hours after surgery. 4. Excessive bleeding, swelling,  wound redness, pus or fever is not normal and should be reported to the office. 5. Wounds may be gently washed with mild soap and water beginning 24 hours after surgery, then patted dry. Scalp wounds may be gently washed (mild shampoo) and gently dried as well. Do not scrub the wound. 6. Antibiotic ointment, Mupirocin, should be lightly applied 2 times per day to any wound. You may dress the wound with a band-aid. If the band-aid irritates your skin, then just use the Bacitracin ointment to cover the wound twice a day.     7. Swelling or discomfort will be improved by elevating the surgical site above the level of the heart, especially the face, scalp or arms, which can be elevated in bed by extra pillows. 8. Do not be concerned if one or even several sutures come out prior to the time of suture removal. It is not unusual for this to occur as the wound swelling decreases. Support of the remaining sutures is sufficient to protect your wound(s). 9. If appropriate, copies of the surgery and pathology reports will be sent to your doctor. 10. Please call the office at [phone] to schedule your follow up appointment for 1 week. I acknowledge receipt of the above discharge instructions:      Patient/Guardian Signature _______________________________________ Date___________________    Luca Gary Signature_______________________________________________ Date___________________     Instructions After Plastic Surgery Procedures    Lisbeth Trinh. Riccardo Pitt MD  [phone]      Please follow these simple instructions to help ensure a safe and comfortable recovery. Any questions can be directed to the office at [phone]. Dr. Riccardo Pitt can also be reached through the answering service at [phone]          YOU ARE RESTRICTED TO A SOFT DIET                1.  You should shower daily and wash hair with gentle shampoo. Apply Bactroban (Mupirocin) ointment to your incisions daily after shower. Also apply nightly to incisions. 2. Wear your postop neck garment as instructed by . He will give this to you tommorow. Remove for shower. 3. Expect a modest amount of redness (inflammation) and possibly some bruising to appear in the days following your surgery. This is normal and will resolve as the wound heals, but may persist until the stitches have been removed.       4. NO HEAVY LIFTING , NO HEAVY EXERCISE, NO YARD WORK, NO HEAVY WORK. NO HOT TUBS, NO POOLS, NO SAUNAS, NO SMOKING, NO SEXUAL ACTIVITY. 5. STAY COOL AND REST QUIETLY                                                                                                                                                                                                                                                      6. WALK INSIDE YOUR HOUSE EVERY 2-3 HOURS  TO PREVENT BLOOD CLOT FORMATION IN THE LEGS                                                                                                                         7. NO NAPROSYN (ALLEVE), NO IBUPROFEN (ADVIL,MOTRIN), NO ASPIRIN    8. The appearance of excessive wound redness, pus or fever or chills is not normal and should be reported to the office. Please also call us for excessive swelling, bleeding, or pain out of control. Also contact us for any difficulty breathing, swelling of the calf area, chest pain, or shortness of breath. 9. Mild to moderate pain is to be expected. You have been given a prescription for Tylenol #3    10. Keep your head elevated. Swelling or discomfort will be improved by elevating the surgical site above the level of the heart, especially the face, scalp or arms, which can be elevated in bed by extra pillows. 11. Call the office at [phone] if you have any questions.     I acknowledge receipt of the above discharge instructions:    Patient/Guardian Signature _______________________________________ Date___________________    Asmita Quiñonez Signature_______________________________________________ Date___________________

## 2020-07-21 NOTE — PROGRESS NOTES
Patient discharged to home. Patient alert and oriented x 4. Vital signs remain stable and patient is afebrile. Discharge instructions and prescriptions discussed with patient. Patient verbalizes understanding. Time allotted for questions. Patient and belongings transported to [facility] via wheel chair. PIV removed.  Discharge instructions given to patient

## 2020-07-21 NOTE — DISCHARGE SUMMARY
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 This was reconstructed using a pedicled [place] flap. He was admitted in the postoperative period overnight for 23-hour observation for bleeding and to make sure that he had adequate oral intake. He was doing well on postoperative day 1. His pain was well controlled. He had no issues. He was able to urinate following the surgery. He was tolerating a full liquid diet, he will be advanced to soft diet and he will be considered stable for discharge. He will be discharged to home today with plans to follow up with Dr. Melinda Bedolla in clinic in 1 week. DISCHARGE CONDITION:  Stable. DISCHARGE DIET:  Soft.         Trudy Jones MD      [id number]/  D:  2020 8:50  T:  2020 13:20  JOB #:  [id number]

## 2020-07-21 NOTE — OP NOTES
[address] [place] [address] REPORT    Name:  Brian Whitman  MR#:  [id number]  :  1940  ACCOUNT #:  [de-identified]  DATE OF SERVICE:  2020    PREOPERATIVE DIAGNOSIS:  Squamous cell carcinoma of lower lip. POSTOPERATIVE DIAGNOSIS:  Squamous cell carcinoma of lower lip. PROCEDURES PERFORMED:  1. Wide re-excision of squamous cell carcinoma of left lower lip, 3.5 cm.  2.  Reconstruction of left lower lip with pedicle flap, Estlander flap from upper lip. SURGEON:  Alphonse Rivera MD    ASSISTANT:  none    ANESTHESIA:  General.    COMPLICATIONS:  none    SPECIMENS REMOVED:  1. Full thickness wedge excision of squamous cell carcinoma of lower lip. 2.  Right lateral margin. 3.  Left lateral margin. IMPLANTS:  none    ESTIMATED BLOOD LOSS:  75 mL. FINDINGS:  Frozen section revealed margins to be free of tumor. Frozen section demonstrates squamous cell carcinoma in the main resection specimen. 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 This is very close to the left oral commissure and was firm and ulcerated involving most of the vermilion for approximately 2 cm of the left lower lip. The patient presents for resection of this and reconstruction as indicated. All risks have been discussed to include bleeding, infection, allergic reaction, anesthesia, wound healing problems, scarring, numbness, DVT, pulmonary embolism, myocardial infarction, stroke, death, and failure of any flaps or grafts I may have performed. The patient is aware of the fact that he may have a smaller oral opening after reconstruction. He is in understanding of the risk of COVID-19 infection in the perioperative period. He was marked in the preoperative holding area and informed consent was signed. Antibiotics were given prior to incision. SCDs were placed and activated prior to induction of anesthesia.     PROCEDURE:  The patient was taken to the operating room and positioned comfortably by Anesthesia and OR staff in the supine position. After adequate anesthesia had been provided, the patient's head and neck were prepped and draped in the usual sterile fashion. Squamous cell carcinoma in question was marked around its periphery and a wedge type resection was planned. This was felt to be necessary due to the severe nature of the squamous cell in question. At minimum, closure requiring a V-shape wedge would be necessary to close and more likely was felt that the tissue from the upper lip would be necessary to reconstruct. This was due given to the proximity of the oral commissure. The lower lip was infiltrated with local anesthetic, the specimen was excised using #15 blade scalpel and thorough hemostasis was achieved. Following excision of the main specimen, two margins were taken, one on each lateral aspect. These were also full thickness excisions of the lateral aspects of the V excision. These were marked with stitches at 6 o'clock anterior. The main specimen was marked with the stitches at 6 o'clock anterior. The specimens were sent for frozen section. Frozen section revealed squamous cell carcinoma of the main specimen and the margins were clear. After obtaining clear margins by frozen section, the total length of the resection was 3.5 cm. This had extended into the oral commissure. Therefore, an Estlander flap was designed from the upper lip. The upper lip was then infiltrated with local anesthetic solution and the infraorbital nerve was blocked. A #15 blade scalpel was used to incise the lateral aspect of the Estlander flap margin. This excision was continued using Bovie electrocautery and careful dissection with scissors to incise this edge of the flap. The medial edge of the flap was also incised using #15 blade scalpel and the dissection was continued using careful use of scissors and electrocautery.   The pedicle to the flap was left full thickness on the medial aspect just superior to the vermilion. Continued dissection was made to mobilize the flap. Once this was performed, the wound was thoroughly irrigated and hemostasis was achieved. The lower lip was closed partially on its mucosal side in order to enable a better insetting of the Estlander flap. This was performed using interrupted #3-0 Vicryl suture. The Estlander flap was then inset on the mucosal aspect using interrupted 3-0 and 4-0 Vicryl sutures. This was performed in a manner as to obtain a water-type seal.  The donor site for the Estlander flap was closed on the mucosal side using interrupted 3-0 Vicryl sutures. The muscular layer for this donor site was closed using interrupted 3-0 Vicryl suture and the skin was closed using interrupted 4-0 nylon suture. Several interrupted 4-0 Vicryl sutures were also placed in the dermis on the donor site. The flap was inset around its periphery using interrupted 4-0 Vicryl at key locations through the muscular layer and interrupted 4-0 Vicryl through the dermis. Interrupted 4-0 nylon was then placed to the skin. The flap bled from its edges prior to inset, the flap had good color after insetting, there was no congestion, and there was no evidence of any lack of perfusion. There was good capillary refill. Sterile dressing was applied. All sponge, needle, and instrument counts were correct at the end of the case. The patient tolerated the procedure well and was transported in stable condition to the recovery room.         MD ZEESHAN [email or web address]  D:  07/20/2020 17:09  T:  07/21/2020 3:32  JOB #:  [id number]

## 2020-07-21 NOTE — PROGRESS NOTES
Problem: Falls - Risk of  Goal: *Absence of Falls  Description: Document Rj Badillo Fall Risk and appropriate interventions in the flowsheet.   Outcome: Progressing Towards Goal  Note: Fall Risk Interventions:            Medication Interventions: Evaluate medications/consider consulting pharmacy, Patient to call before getting OOB, Teach patient to arise slowly    Elimination Interventions: Call light in reach, Patient to call for help with toileting needs              Problem: Patient Education: Go to Patient Education Activity  Goal: Patient/Family Education  Outcome: Progressing Towards Goal

## 2020-07-21 NOTE — PROGRESS NOTES
Observation notice provided in writing to patient and/or caregiver as well as verbal explanation of the policy. Patients who are in outpatient status also receive the Observation notice.       SHER Drake/DIAZ

## 2020-07-21 NOTE — PROGRESS NOTES
Progress Note    Patient: Ena Harris MRN: [id number]  SSN: [id number]    YOB: 1940  Age: 78 y.o. Sex: male      Admit Date: 2020    1 Day Post-Op    Procedure:  Procedure(s):  EXCISION SCCA LOWER LIP WITH ESTANDER FLAP     Subjective:     Patient has no complaints. Objective:     Visit Vitals  /72 (BP 1 Location: Left arm, BP Patient Position: At rest)   Pulse 88   Temp 99 °F (37.2 °C)   Resp 18   Ht 5' 10\" (1.778 m)   Wt 100.8 kg (222 lb 3.6 oz)   SpO2 94%   BMI 31.89 kg/m²       Temp (24hrs), Av.9 °F (36.6 °C), Min:97 °F (36.1 °C), Max:99 °F (37.2 °C)      Physical Exam:    GENERAL: alert, cooperative, no distress, appears stated age, EYE: negative, LYMPHATIC: Cervical, supraclavicular, and axillary nodes normal. , THROAT & NECK: normal and no erythema or exudates noted. , LUNG: clear to auscultation bilaterally, HEART: regular rate and rhythm, ABDOMEN: normal findings: no scars, striae, dilated veins, rashes, or lesions, EXTREMITIES:  extremities normal, atraumatic, no cyanosis or edema, SKIN: Normal. and no rash or abnormalities  No bleeding, flap viable, good color, no congestion    Data Review: reviewed  Nursing documentation and I & O    Lab Review: All lab results for the last 24 hours reviewed.     Assessment:     Hospital Problems  Date Reviewed: 2020          Codes Class Noted POA    SCCA (squamous cell carcinoma) of skin ICD-10-CM: C44.92  ICD-9-CM: 173.92  2020 Unknown              Plan/Recommendations/Medical Decision Making:     Continue present treatment Discharge today

## 2022-03-20 PROBLEM — C44.92 SCCA (SQUAMOUS CELL CARCINOMA) OF SKIN: Status: ACTIVE | Noted: 2020-07-20

## 2023-05-25 RX ORDER — OMEPRAZOLE 20 MG/1
20 TABLET, DELAYED RELEASE ORAL DAILY PRN
COMMUNITY

## (undated) DEVICE — DRAPE,REIN 53X77,STERILE: Brand: MEDLINE

## (undated) DEVICE — 10FR FRAZIER SUCTION HANDLE: Brand: CARDINAL HEALTH

## (undated) DEVICE — INTENDED FOR TISSUE SEPARATION, AND OTHER PROCEDURES THAT REQUIRE A SHARP SURGICAL BLADE TO PUNCTURE OR CUT.: Brand: BARD-PARKER ® CARBON RIB-BACK BLADES

## (undated) DEVICE — SUTURE NONABSORBABLE MONOFILAMENT 5-0 PS-2 18 IN BLK ETHILON 1666H

## (undated) DEVICE — STERILE COTTON BALLS LARGE 5/P: Brand: MEDLINE

## (undated) DEVICE — INSULATED NEEDLE ELECTRODE: Brand: EDGE

## (undated) DEVICE — TOWEL,OR,DSP,ST,BLUE,STD,2/PK,40PK/CS: Brand: MEDLINE

## (undated) DEVICE — SUTURE VCRL SZ 3-0 L27IN ABSRB UD L26MM SH 1/2 CIR J416H

## (undated) DEVICE — SUT SLK 0 30IN SH BLK --

## (undated) DEVICE — SUTURE VCRL SZ 3-0 L27IN ABSRB UD FS-2 L19MM 1/2 CIR J423H

## (undated) DEVICE — NEEDLE HYPO 25GA L1.5IN BVL ORIENTED ECLIPSE

## (undated) DEVICE — SUT CHRMC 3-0 27IN SH BRN --

## (undated) DEVICE — HANDLE LT SNAP ON ULT DURABLE LENS FOR TRUMPF ALC DISPOSABLE

## (undated) DEVICE — SUT ETHLN 4-0 18IN PS2 BLK --

## (undated) DEVICE — SUTURE VCRL SZ 4-0 L27IN ABSRB UD L26MM SH 1/2 CIR J415H

## (undated) DEVICE — SYR 10ML CTRL LR LCK NSAF LF --

## (undated) DEVICE — YANKAUER,BULB TIP,W/O VENT,RIGID,STERILE: Brand: MEDLINE

## (undated) DEVICE — TUBING, SUCTION, 1/4" X 12', STRAIGHT: Brand: MEDLINE

## (undated) DEVICE — SOLUTION SCRB 2OZ 10% POVIDONE IOD ANTIMIC BTL

## (undated) DEVICE — INFECTION CONTROL KIT SYS

## (undated) DEVICE — SUT ETHLN 5-0 18IN P3 BLK --

## (undated) DEVICE — SURGICAL PROCEDURE PACK BASIN MAJ SET CUST NO CAUT

## (undated) DEVICE — ROCKER SWITCH PENCIL BLADE ELECTRODE, HOLSTER: Brand: EDGE

## (undated) DEVICE — REM POLYHESIVE ADULT PATIENT RETURN ELECTRODE: Brand: VALLEYLAB

## (undated) DEVICE — SUTURE PERMA-HAND SZ 4-0 L18IN NONABSORBABLE BLK L13MM TF C084D

## (undated) DEVICE — SOLUTION IV 1000ML 0.9% SOD CHL

## (undated) DEVICE — STERILE POLYISOPRENE POWDER-FREE SURGICAL GLOVES: Brand: PROTEXIS

## (undated) DEVICE — SUT CHRMC 4-0 27IN SH BRN --

## (undated) DEVICE — PACK,EENT,TURBAN DRAPE,PK II: Brand: MEDLINE

## (undated) DEVICE — MARKER,SKIN,WI/RULER AND LABELS: Brand: MEDLINE

## (undated) DEVICE — GARMENT,MEDLINE,DVT,INT,CALF,MED, GEN2: Brand: MEDLINE

## (undated) DEVICE — STRIP,CLOSURE,WOUND,MEDI-STRIP,1/2X4: Brand: MEDLINE

## (undated) DEVICE — TOWEL SURG W17XL27IN STD BLU COT NONFENESTRATED PREWASHED

## (undated) DEVICE — MASTISOL ADHESIVE LIQ 2/3ML

## (undated) DEVICE — DRESSING,GAUZE,XEROFORM,CURAD,1"X8",ST: Brand: CURAD